# Patient Record
Sex: MALE | Race: BLACK OR AFRICAN AMERICAN | NOT HISPANIC OR LATINO | Employment: FULL TIME | URBAN - METROPOLITAN AREA
[De-identification: names, ages, dates, MRNs, and addresses within clinical notes are randomized per-mention and may not be internally consistent; named-entity substitution may affect disease eponyms.]

---

## 2023-12-06 ENCOUNTER — APPOINTMENT (OUTPATIENT)
Dept: PRIMARY CARE | Facility: CLINIC | Age: 28
End: 2023-12-06

## 2024-07-18 ENCOUNTER — APPOINTMENT (OUTPATIENT)
Dept: CARDIOLOGY | Facility: HOSPITAL | Age: 29
End: 2024-07-18

## 2024-07-18 ENCOUNTER — HOSPITAL ENCOUNTER (EMERGENCY)
Facility: HOSPITAL | Age: 29
Discharge: HOME | End: 2024-07-18

## 2024-07-18 ENCOUNTER — HOSPITAL ENCOUNTER (OUTPATIENT)
Dept: CARDIOLOGY | Facility: HOSPITAL | Age: 29
Discharge: HOME | End: 2024-07-18

## 2024-07-18 ENCOUNTER — APPOINTMENT (OUTPATIENT)
Dept: RADIOLOGY | Facility: HOSPITAL | Age: 29
End: 2024-07-18

## 2024-07-18 VITALS
SYSTOLIC BLOOD PRESSURE: 106 MMHG | RESPIRATION RATE: 16 BRPM | BODY MASS INDEX: 17.5 KG/M2 | HEIGHT: 71 IN | WEIGHT: 125 LBS | HEART RATE: 66 BPM | DIASTOLIC BLOOD PRESSURE: 68 MMHG | OXYGEN SATURATION: 100 % | TEMPERATURE: 97.7 F

## 2024-07-18 DIAGNOSIS — J06.9 VIRAL UPPER RESPIRATORY TRACT INFECTION: Primary | ICD-10-CM

## 2024-07-18 DIAGNOSIS — T14.8XXA MUSCLE STRAIN: ICD-10-CM

## 2024-07-18 LAB
CARDIAC TROPONIN I PNL SERPL HS: <3 NG/L (ref 0–20)
CARDIAC TROPONIN I PNL SERPL HS: <3 NG/L (ref 0–20)
SARS-COV-2 RNA RESP QL NAA+PROBE: NOT DETECTED

## 2024-07-18 PROCEDURE — 93005 ELECTROCARDIOGRAM TRACING: CPT

## 2024-07-18 PROCEDURE — 84484 ASSAY OF TROPONIN QUANT: CPT | Performed by: NURSE PRACTITIONER

## 2024-07-18 PROCEDURE — 71046 X-RAY EXAM CHEST 2 VIEWS: CPT

## 2024-07-18 PROCEDURE — 36415 COLL VENOUS BLD VENIPUNCTURE: CPT | Performed by: NURSE PRACTITIONER

## 2024-07-18 PROCEDURE — 71046 X-RAY EXAM CHEST 2 VIEWS: CPT | Mod: FOREIGN READ | Performed by: RADIOLOGY

## 2024-07-18 PROCEDURE — 87635 SARS-COV-2 COVID-19 AMP PRB: CPT | Performed by: NURSE PRACTITIONER

## 2024-07-18 PROCEDURE — 99283 EMERGENCY DEPT VISIT LOW MDM: CPT

## 2024-07-18 ASSESSMENT — COLUMBIA-SUICIDE SEVERITY RATING SCALE - C-SSRS
2. HAVE YOU ACTUALLY HAD ANY THOUGHTS OF KILLING YOURSELF?: NO
6. HAVE YOU EVER DONE ANYTHING, STARTED TO DO ANYTHING, OR PREPARED TO DO ANYTHING TO END YOUR LIFE?: NO
1. IN THE PAST MONTH, HAVE YOU WISHED YOU WERE DEAD OR WISHED YOU COULD GO TO SLEEP AND NOT WAKE UP?: NO

## 2024-07-18 ASSESSMENT — PAIN - FUNCTIONAL ASSESSMENT: PAIN_FUNCTIONAL_ASSESSMENT: 0-10

## 2024-07-18 ASSESSMENT — PAIN DESCRIPTION - LOCATION: LOCATION: CHEST

## 2024-07-18 ASSESSMENT — PAIN SCALES - GENERAL: PAINLEVEL_OUTOF10: 6

## 2024-07-18 NOTE — DISCHARGE INSTRUCTIONS
Self limiting, treat symptoms as needed with OTC medications  Muscle strain- stretch after driving to decrease muscle tension   PCP referral placed call to schedule appointment for first available

## 2024-07-18 NOTE — ED PROVIDER NOTES
"HPI     CC: Flu Symptoms     HPI: Leo Ortiz is a 29 y.o. male with no past medical history presents with complaints of sore throat X1 day. He endorses associated cough, nausea, diarrhea, headache, chills and mid sternal chest pain. He rates chest pain 5/10 exacerbated with certain movements. He denies sick contact but works as a . He has not taking anything for his symptoms.    ROS: 10-point review of systems was performed and is otherwise negative except as noted in HPI.      Past Medical History: Noncontributory except per HPI     Past Surgical History: Noncontributory except per HPI     Family History: Reviewed and noncontributory     Social History:  Noncontributory except per HPI       No Known Allergies    Past Medical History:   Diagnosis Date    Allergy, unspecified, initial encounter     Allergic    Displaced fracture of distal phalanx of right lesser toe(s), initial encounter for open fracture 08/07/2019    Open displaced fracture of distal phalanx of lesser toe of right foot, initial encounter    Nondisplaced fracture of base of fifth metacarpal bone, right hand, initial encounter for closed fracture 12/08/2017    Closed nondisplaced fracture of base of fifth metacarpal bone of right hand, initial encounter       Home Meds: No current outpatient medications     ED Triage Vitals [07/18/24 1223]   Temperature Heart Rate Respirations BP   36.5 °C (97.7 °F) 67 16 110/77      Pulse Ox Temp src Heart Rate Source Patient Position   98 % -- -- --      BP Location FiO2 (%)     -- --         Heart Rate:  [66-67]   Temperature:  [36.5 °C (97.7 °F)]   Respirations:  [16]   BP: (106-110)/(68-77)   Height:  [180.3 cm (5' 11\")]   Weight:  [56.7 kg (125 lb)]   Pulse Ox:  [98 %-100 %]      Physical Exam:  Physical Exam  Vitals and nursing note reviewed.   Constitutional:       General: He is not in acute distress.     Appearance: He is well-developed.   HENT:      Head: Normocephalic and atraumatic.      " Mouth/Throat:      Lips: Pink.      Mouth: Mucous membranes are moist.      Tonsils: No tonsillar exudate.   Eyes:      Conjunctiva/sclera: Conjunctivae normal.   Cardiovascular:      Rate and Rhythm: Normal rate and regular rhythm.      Heart sounds: No murmur heard.  Pulmonary:      Effort: Pulmonary effort is normal. No respiratory distress.      Breath sounds: Normal breath sounds.   Abdominal:      Palpations: Abdomen is soft.      Tenderness: There is no abdominal tenderness.   Musculoskeletal:         General: No swelling.      Cervical back: Neck supple.   Skin:     General: Skin is warm and dry.      Capillary Refill: Capillary refill takes less than 2 seconds.   Neurological:      Mental Status: He is alert.   Psychiatric:         Mood and Affect: Mood normal.          Diagnostic Results        Labs Reviewed   SARS-COV-2 PCR - Normal       Result Value    Coronavirus 2019, PCR Not Detected      Narrative:     This assay has received FDA Emergency Use Authorization (EUA) and is only authorized for the duration of time that circumstances exist to justify the authorization of the emergency use of in vitro diagnostic tests for the detection of SARS-CoV-2 virus and/or diagnosis of COVID-19 infection under section 564(b)(1) of the Act, 21 U.S.C. 360bbb-3(b)(1). This assay is an in vitro diagnostic nucleic acid amplification test for the qualitative detection of SARS-CoV-2 from nasopharyngeal specimens and has been validated for use at [facility]. Negative results do not preclude COVID-19 infections and should not be used as the sole basis for diagnosis, treatment, or other management decisions.     SERIAL TROPONIN-INITIAL - Normal    Troponin I, High Sensitivity <3      Narrative:     Less than 99th percentile of normal range cutoff-  Female and children under 18 years old <14 ng/L; Male <21 ng/L: Negative  Repeat testing should be performed if clinically indicated.     Female and  children under 18 years old 14-50 ng/L; Male 21-50 ng/L:  Consistent with possible cardiac damage and possible increased clinical   risk. Serial measurements may help to assess extent of myocardial damage.     >50 ng/L: Consistent with cardiac damage, increased clinical risk and  myocardial infarction. Serial measurements may help assess extent of   myocardial damage.      NOTE: Children less than 1 year old may have higher baseline troponin   levels and results should be interpreted in conjunction with the overall   clinical context.     NOTE: Troponin I testing is performed using a different   testing methodology at [facility] than at other   [facility]. Direct result comparisons should only   be made within the same method.   SERIAL TROPONIN, 1 HOUR - Normal    Troponin I, High Sensitivity <3      Narrative:     Less than 99th percentile of normal range cutoff-  Female and children under 18 years old <14 ng/L; Male <21 ng/L: Negative  Repeat testing should be performed if clinically indicated.     Female and children under 18 years old 14-50 ng/L; Male 21-50 ng/L:  Consistent with possible cardiac damage and possible increased clinical   risk. Serial measurements may help to assess extent of myocardial damage.     >50 ng/L: Consistent with cardiac damage, increased clinical risk and  myocardial infarction. Serial measurements may help assess extent of   myocardial damage.      NOTE: Children less than 1 year old may have higher baseline troponin   levels and results should be interpreted in conjunction with the overall   clinical context.     NOTE: Troponin I testing is performed using a different   testing methodology at [facility] than at other   [facility]. Direct result comparisons should only   be made within the same method.   TROPONIN SERIES- (INITIAL, 1 HR)    Narrative:     The following orders were created for panel order Troponin I Series, High Sensitivity (0, 1  HR).  Procedure                               Abnormality         Status                     ---------                               -----------         ------                     Troponin I, High Sensiti...[[id number]]  Normal              Final result               Troponin, High Sensitivi...[[id number]]  Normal              Final result                 Please view results for these tests on the individual orders.         XR chest 2 views   Final Result   No radiographic evidence of acute cardiopulmonary disease.   Signed by Gautam Leon MD                    Waylon Coma Scale Score: 15                  Procedure  Procedures    ED Course & MDM   Assessment/Plan:     Medications - No data to display     ED Course as of 07/18/24 1620   Thu Jul 18, 2024   1406 I personally reviewed COViD results and they are negative. Likely URI, symptomatic treatment. [SOLA]   1451 I personally reviewed CXR, troponin and EKG and did not find any acute findings. EKG NSR, HR 61, benign J point elevation repeat unchanged, evaluated my Dr. Hayward  [SOLA]      ED Course User Index  [SOLA] Yamile Servin V, APRN-CNP         Diagnoses as of 07/18/24 1620   Viral upper respiratory tract infection   Muscle strain       Medical Decision Making    Leo Ortiz is a 29 y.o. male with no past medical history presents with complaints of sore throat, cough, nausea, diarrhea, headache, chills and mid sternal chest pain X1 day. On exam VSS, he is non toxic appearing. Lung are without erythema or exudate. Lungs are clear throughout. Heart is RRR, no murmur noted, S1S2. COViD vs URI vs Pneumonia vs Muscle Strain. 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 Covid results negative, CXR negative for acute cardiopulmonary process less likely pneumonia or covid but more likely URI. Symptomatic treatment. Heart Score 0 points Low Score (0-3 points) Risk of MACE of 0.9-1.7%, 2 negative Troponin and EKG shows NSR with a  benign j point elevation, evaluated by Dr Hayward. ACS less likely as pain has been going on for a while and is reproducible with activity. Likely muscle strain, advised to do stretches after driving as he is a . Patient discharged home advised of findings and that nothing is ever 100%. Dx with URI, self limiting, with symptomatic treatment. Muscle strain, rest and stretching advised.  Red flag addressed advised to return to hospital for any red flags, worsening or persist symptoms. Referral for PCP advised to call and schedule appointment. Disposition: Home    ED Prescriptions    None         Social Determinants Affecting Care: none     Nancy Mccullough PA-C    This note was dictated by speech recognition. Minor errors in transcription may be present.     Yamile Servin V, APRN-CNP  07/18/24 9191    Patient evaluated with YASIR.  He is a 29-year-old with no past medical history complaining of cold-like symptoms over the last day.  Regarding the chest pain, he states that the pain has been ongoing for multiple months in his chest and is worse with certain movements.  Has not taken any medications for the above symptoms.  COVID swab obtained and was negative.  Troponins ordered by provider which were negative.  EKG was normal sinus rhythm and benign J-point elevation on 2 EKGs with no priors for comparison.  Given that the patient's pain is likely musculoskeletal, low concern for acute coronary pathology.  Patient appears well-hydrated and vitals are normal.  Agree with plan for self-limiting treatment, stretching exercises, and NSAIDs as needed.    MURRAY Kaplan PA-C  07/18/24 8204

## 2024-07-18 NOTE — ED TRIAGE NOTES
PT is A/ox4 coming in with flu like symptoms. PT is experiencing a cough/nausea/vomiting/diarrhea. PT stated he also started to have mid sternal chest pain.

## 2024-07-18 NOTE — LETTER
July 18, 2024    Patient: Leo Ortiz   YOB: 1995   Date of Visit: 7/18/2024       To Whom It May Concern:    Leo Ortiz was seen and treated in our emergency department on 7/18/2024. He may return on Saturday 7/20/2024.     If you have any questions or concerns, please don't hesitate to call.            [facility] Emergency Department

## 2024-07-19 LAB
ATRIAL RATE: 61 BPM
ATRIAL RATE: 62 BPM
P AXIS: 72 DEGREES
P AXIS: 74 DEGREES
P OFFSET: 206 MS
P OFFSET: 207 MS
P ONSET: 158 MS
P ONSET: 161 MS
PR INTERVAL: 120 MS
PR INTERVAL: 126 MS
Q ONSET: 221 MS
Q ONSET: 221 MS
QRS COUNT: 10 BEATS
QRS COUNT: 10 BEATS
QRS DURATION: 90 MS
QRS DURATION: 90 MS
QT INTERVAL: 378 MS
QT INTERVAL: 380 MS
QTC CALCULATION(BAZETT): 382 MS
QTC CALCULATION(BAZETT): 383 MS
QTC FREDERICIA: 381 MS
QTC FREDERICIA: 382 MS
R AXIS: 85 DEGREES
R AXIS: 86 DEGREES
T AXIS: 77 DEGREES
T AXIS: 83 DEGREES
T OFFSET: 410 MS
T OFFSET: 411 MS
VENTRICULAR RATE: 61 BPM
VENTRICULAR RATE: 62 BPM

## 2024-07-22 ENCOUNTER — APPOINTMENT (OUTPATIENT)
Dept: PRIMARY CARE | Facility: CLINIC | Age: 29
End: 2024-07-22

## 2024-10-03 ENCOUNTER — APPOINTMENT (OUTPATIENT)
Dept: RADIOLOGY | Facility: HOSPITAL | Age: 29
End: 2024-10-03

## 2024-10-03 ENCOUNTER — HOSPITAL ENCOUNTER (EMERGENCY)
Facility: HOSPITAL | Age: 29
Discharge: HOME | End: 2024-10-04
Attending: STUDENT IN AN ORGANIZED HEALTH CARE EDUCATION/TRAINING PROGRAM

## 2024-10-03 VITALS
BODY MASS INDEX: 17.5 KG/M2 | DIASTOLIC BLOOD PRESSURE: 57 MMHG | HEIGHT: 71 IN | RESPIRATION RATE: 16 BRPM | HEART RATE: 79 BPM | SYSTOLIC BLOOD PRESSURE: 124 MMHG | OXYGEN SATURATION: 96 % | WEIGHT: 125 LBS | TEMPERATURE: 97.5 F
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
## 2024-10-03 DIAGNOSIS — K08.531 TOOTH FRACTURE WITH LOSS OF RESTORATIVE MATERIAL: ICD-10-CM

## 2024-10-03 PROCEDURE — 90471 IMMUNIZATION ADMIN: CPT

## 2024-10-03 PROCEDURE — 73130 X-RAY EXAM OF HAND: CPT | Mod: RIGHT SIDE | Performed by: RADIOLOGY

## 2024-10-03 PROCEDURE — 99285 EMERGENCY DEPT VISIT HI MDM: CPT | Performed by: STUDENT IN AN ORGANIZED HEALTH CARE EDUCATION/TRAINING PROGRAM

## 2024-10-03 PROCEDURE — 70450 CT HEAD/BRAIN W/O DYE: CPT | Performed by: RADIOLOGY

## 2024-10-03 PROCEDURE — 2500000004 HC RX 250 GENERAL PHARMACY W/ HCPCS (ALT 636 FOR OP/ED)

## 2024-10-03 PROCEDURE — 70450 CT HEAD/BRAIN W/O DYE: CPT

## 2024-10-03 PROCEDURE — 99285 EMERGENCY DEPT VISIT HI MDM: CPT

## 2024-10-03 PROCEDURE — 2500000001 HC RX 250 WO HCPCS SELF ADMINISTERED DRUGS (ALT 637 FOR MEDICARE OP)

## 2024-10-03 PROCEDURE — 72125 CT NECK SPINE W/O DYE: CPT

## 2024-10-03 PROCEDURE — 73130 X-RAY EXAM OF HAND: CPT | Mod: RT

## 2024-10-03 PROCEDURE — 72125 CT NECK SPINE W/O DYE: CPT | Performed by: RADIOLOGY

## 2024-10-03 PROCEDURE — 90715 TDAP VACCINE 7 YRS/> IM: CPT

## 2024-10-03 PROCEDURE — 70486 CT MAXILLOFACIAL W/O DYE: CPT

## 2024-10-03 RX ORDER — ACETAMINOPHEN 325 MG/1
975 TABLET ORAL ONCE
Status: COMPLETED | OUTPATIENT
Start: 2024-10-03 | End: 2024-10-03

## 2024-10-03 RX ORDER — AMOXICILLIN AND CLAVULANATE POTASSIUM 875; 125 MG/1; MG/1
1 TABLET, FILM COATED ORAL ONCE
Status: COMPLETED | OUTPATIENT
Start: 2024-10-03 | End: 2024-10-03

## 2024-10-03 RX ORDER — OXYCODONE HYDROCHLORIDE 5 MG/1
5 TABLET ORAL ONCE
Status: COMPLETED | OUTPATIENT
Start: 2024-10-03 | End: 2024-10-03

## 2024-10-03 RX ORDER — AMOXICILLIN AND CLAVULANATE POTASSIUM 875; 125 MG/1; MG/1
1 TABLET, FILM COATED ORAL EVERY 12 HOURS
Qty: 14 TABLET | Refills: 0 | Status: SHIPPED | OUTPATIENT
Start: 2024-10-03 | End: 2024-10-04

## 2024-10-03 ASSESSMENT — COLUMBIA-SUICIDE SEVERITY RATING SCALE - C-SSRS
6. HAVE YOU EVER DONE ANYTHING, STARTED TO DO ANYTHING, OR PREPARED TO DO ANYTHING TO END YOUR LIFE?: NO
2. HAVE YOU ACTUALLY HAD ANY THOUGHTS OF KILLING YOURSELF?: NO
1. IN THE PAST MONTH, HAVE YOU WISHED YOU WERE DEAD OR WISHED YOU COULD GO TO SLEEP AND NOT WAKE UP?: NO

## 2024-10-03 ASSESSMENT — PAIN DESCRIPTION - LOCATION: LOCATION: MOUTH

## 2024-10-03 ASSESSMENT — PAIN DESCRIPTION - PAIN TYPE: TYPE: ACUTE PAIN

## 2024-10-03 ASSESSMENT — PAIN - FUNCTIONAL ASSESSMENT: PAIN_FUNCTIONAL_ASSESSMENT: 0-10

## 2024-10-03 ASSESSMENT — VISUAL ACUITY
OD: 20/40
OS: 20/50
OU: 20/40

## 2024-10-03 ASSESSMENT — PAIN SCALES - GENERAL: PAINLEVEL_OUTOF10: 8

## 2024-10-04 PROCEDURE — 76376 3D RENDER W/INTRP POSTPROCES: CPT

## 2024-10-04 RX ORDER — AMOXICILLIN AND CLAVULANATE POTASSIUM 875; 125 MG/1; MG/1
1 TABLET, FILM COATED ORAL EVERY 12 HOURS
Qty: 14 TABLET | Refills: 0 | Status: SHIPPED | OUTPATIENT
Start: 2024-10-04 | End: 2024-10-11
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
